# Patient Record
Sex: FEMALE | Race: ASIAN | NOT HISPANIC OR LATINO | Employment: FULL TIME | URBAN - METROPOLITAN AREA
[De-identification: names, ages, dates, MRNs, and addresses within clinical notes are randomized per-mention and may not be internally consistent; named-entity substitution may affect disease eponyms.]

---

## 2017-05-28 ENCOUNTER — OFFICE VISIT (OUTPATIENT)
Dept: URGENT CARE | Facility: PHYSICIAN GROUP | Age: 22
End: 2017-05-28

## 2017-05-28 VITALS
DIASTOLIC BLOOD PRESSURE: 80 MMHG | OXYGEN SATURATION: 97 % | TEMPERATURE: 99.1 F | WEIGHT: 181 LBS | RESPIRATION RATE: 18 BRPM | HEART RATE: 94 BPM | HEIGHT: 65 IN | SYSTOLIC BLOOD PRESSURE: 120 MMHG | BODY MASS INDEX: 30.16 KG/M2

## 2017-05-28 DIAGNOSIS — J03.90 EXUDATIVE TONSILLITIS: ICD-10-CM

## 2017-05-28 DIAGNOSIS — J02.9 SORE THROAT: ICD-10-CM

## 2017-05-28 DIAGNOSIS — J20.9 ACUTE BRONCHITIS, UNSPECIFIED ORGANISM: ICD-10-CM

## 2017-05-28 PROCEDURE — 99203 OFFICE O/P NEW LOW 30 MIN: CPT | Performed by: FAMILY MEDICINE

## 2017-05-28 RX ORDER — AMOXICILLIN AND CLAVULANATE POTASSIUM 875; 125 MG/1; MG/1
1 TABLET, FILM COATED ORAL 2 TIMES DAILY
Qty: 14 TAB | Refills: 0 | Status: SHIPPED | OUTPATIENT
Start: 2017-05-28 | End: 2017-06-04

## 2017-05-28 RX ORDER — PREDNISONE 20 MG/1
40 TABLET ORAL EVERY MORNING
Qty: 12 TAB | Refills: 0 | Status: SHIPPED | OUTPATIENT
Start: 2017-05-28 | End: 2017-06-03

## 2017-05-28 ASSESSMENT — ENCOUNTER SYMPTOMS
FEVER: 0
COUGH: 1
SPUTUM PRODUCTION: 1
SORE THROAT: 1
ORTHOPNEA: 0
CHILLS: 0
DIZZINESS: 0
FOCAL WEAKNESS: 0

## 2017-05-28 NOTE — PROGRESS NOTES
"Subjective:      Mateusz Cole is a 21 y.o. female who presents with Pharyngitis    Chief Complaint   Patient presents with   • Pharyngitis     sore throat, fever, bosy aches and [id number]        - This is a very pleasant 21 y.o. female with complaints of 1 week w/ ST and cough w/ subjective fevers. Not getting better w/ otc meds. No NV/cp/sob          ALLERGIES:  Review of patient's allergies indicates no known allergies.     PMH:  Past Medical History   Diagnosis Date   • Blind      Blind in right eye        MEDS:    Current outpatient prescriptions:   •  amoxicillin-clavulanate (AUGMENTIN) 875-125 MG Tab, Take 1 Tab by mouth 2 times a day for 7 days., Disp: 14 Tab, Rfl: 0  •  Hydrocod Polst-CPM Polst ER (TUSSIONEX) 10-8 MG/5ML Suspension Extended Release, Take 5 mL by mouth every 12 hours as needed for Cough., Disp: 120 mL, Rfl: 0  •  predniSONE (DELTASONE) 20 MG Tab, Take 2 Tabs by mouth every morning for 6 days., Disp: 12 Tab, Rfl: 0    ** I have documented what I find to be significant in regards to past medical, social, family and surgical history  in my HPI or under PMH/PSH/FH review section, otherwise it is contributory **             Pharyngitis   Associated symptoms include coughing. Pertinent negatives include no congestion.       Review of Systems   Constitutional: Negative for fever and chills.   HENT: Positive for sore throat. Negative for congestion.    Respiratory: Positive for cough and sputum production.    Cardiovascular: Negative for chest pain and orthopnea.   Neurological: Negative for dizziness and focal weakness.          Objective:     /80 mmHg  Pulse 94  Temp(Src) 37.3 °C (99.1 °F)  Resp 18  Ht 1.651 m (5' 5\")  Wt 82.101 kg (181 lb)  BMI 30.12 kg/m2  SpO2 97%  LMP 05/12/2017     Physical Exam   Constitutional: She appears well-developed. No distress.   HENT:   Head: Normocephalic and atraumatic.   Mouth/Throat: Oropharynx is clear and moist.   Eyes: Conjunctivae are " normal.   Neck: Neck supple.   Cardiovascular: Regular rhythm.    No murmur heard.  Pulmonary/Chest: Effort normal and breath sounds normal. No respiratory distress.   Neurological: She is alert. She exhibits normal muscle tone.   Skin: Skin is warm and dry.   Psychiatric: She has a normal mood and affect. Judgment normal.   Nursing note and vitals reviewed.              Assessment/Plan:         1. Sore throat     2. Exudative tonsillitis  amoxicillin-clavulanate (AUGMENTIN) 875-125 MG Tab   3. Acute bronchitis, unspecified organism  Hydrocod Polst-CPM Polst ER (TUSSIONEX) 10-8 MG/5ML Suspension Extended Release    predniSONE (DELTASONE) 20 MG Tab             Dx & d/c instructions discussed w/ patient and/or family members. Follow up w/ Prvt Dr or here in 3-4 days if not getting better, sooner if needed,  ER if worse and UC/PCP unavailable.        Possible side effects (i.e. Rash, GI upset/constipation, sedation, elevation of BP or sugars) of any medications given discussed.

## 2017-05-28 NOTE — MR AVS SNAPSHOT
"Mateusz Cole   2017 2:50 PM   Office Visit   MRN: [id number]    Department:  [facility]   Dept Phone:  [phone]    Description:  Female : 1995   Provider:  Champ Faustin M.D.           Reason for Visit     Pharyngitis sore throat, fever, bosy aches and [id number]      Allergies as of 2017     No Known Allergies      You were diagnosed with     Sore throat   [267003]       Exudative tonsillitis   [[id number]]       Acute bronchitis, unspecified organism   [[id number]]         Vital Signs     Blood Pressure Pulse Temperature Respirations Height Weight    120/80 mmHg 94 37.3 °C (99.1 °F) 18 1.651 m (5' 5\") 82.101 kg (181 lb)    Body Mass Index Oxygen Saturation Last Menstrual Period Smoking Status          30.12 kg/m2 97% 2017 Never Smoker         Basic Information     Date Of Birth Sex Race Ethnicity Preferred Language    1995 Female  Non- English      Health Maintenance     Patient has no pending health maintenance at this time      Current Immunizations     No immunizations on file.      Below and/or attached are the medications your provider expects you to take. Review all of your home medications and newly ordered medications with your provider and/or pharmacist. Follow medication instructions as directed by your provider and/or pharmacist. Please keep your medication list with you and share with your provider. Update the information when medications are discontinued, doses are changed, or new medications (including over-the-counter products) are added; and carry medication information at all times in the event of emergency situations     Allergies:  No Known Allergies          Medications  Valid as of: May 28, 2017 -  3:32 PM    Generic Name Brand Name Tablet Size Instructions for use    Amoxicillin-Pot Clavulanate (Tab) AUGMENTIN 875-125 MG Take 1 Tab by mouth 2 times a day for 7 days.        Hydrocod Polst-Chlorphen Polst (Suspension Extended " Release) TUSSIONEX 10-8 MG/5ML Take 5 mL by mouth every 12 hours as needed for Cough.        PredniSONE (Tab) DELTASONE 20 MG Take 2 Tabs by mouth every morning for 6 days.        .                 Medicines prescribed today were sent to:     [facility] [address] [place] [address] [place], [place] - [address] [facility] [place] [employer] [address]    [address] [place] [address]    Phone: [phone] Fax: [phone]    Open 24 Hours?: No      Medication refill instructions:       If your prescription bottle indicates you have medication refills left, it is not necessary to call your provider’s office. Please contact your pharmacy and they will refill your medication.    If your prescription bottle indicates you do not have any refills left, you may request refills at any time through one of the following ways: The online [employer] system (except Urgent Care), by calling your provider’s office, or by asking your pharmacy to contact your provider’s office with a refill request. Medication refills are processed only during regular business hours and may not be available until the next business day. Your provider may request additional information or to have a follow-up visit with you prior to refilling your medication.   *Please Note: Medication refills are assigned a new Rx number when refilled electronically. Your pharmacy may indicate that no refills were authorized even though a new prescription for the same medication is available at the pharmacy. Please request the medicine by name with the pharmacy before contacting your provider for a refill.           [employer] Access Code: [email or web address]  Expires: 6/27/2017  3:32 PM    [employer]  A secure, online tool to manage your health information     [employer]’s [employer]® is a secure, online tool that connects you to your personalized health information from the privacy of your home -- day or night - making it very easy for you to manage your healthcare. Once the activation process  is completed, you can even access your medical information using the [employer] benita, which is available for free in the Apple Benita store or Google Play store.     [employer] provides the following levels of access (as shown below):   My Chart Features   [facility] Doctor [facility]  Specialists [facility]  Urgent  Care [facility]  Primary Care  Doctor   Email your healthcare team securely and privately 24/7 X X X    Manage appointments: schedule your next appointment; view details of past/upcoming appointments X      Request prescription refills. X      View recent personal medical records, including lab and immunizations X X X X   View health record, including health history, allergies, medications X X X X   Read reports about your outpatient visits, procedures, consult and ER notes X X X X   See your discharge summary, which is a recap of your hospital and/or ER visit that includes your diagnosis, lab results, and care plan. X X       How to register for [employer]:  1. Go to  [email or web address].  2. Click on the Sign Up Now box, which takes you to the New Member Sign Up page. You will need to provide the following information:  a. Enter your [employer] Access Code exactly as it appears at the top of this page. (You will not need to use this code after you’ve completed the sign-up process. If you do not sign up before the expiration date, you must request a new code.)   b. Enter your date of birth.   c. Enter your home email address.   d. Click Submit, and follow the next screen’s instructions.  3. Create a [employer] ID. This will be your [employer] login ID and cannot be changed, so think of one that is secure and easy to remember.  4. Create a [employer] password. You can change your password at any time.  5. Enter your Password Reset Question and Answer. This can be used at a later time if you forget your password.   6. Enter your e-mail address. This allows you to receive e-mail notifications when new information is  available in [employer].  7. Click Sign Up. You can now view your health information.    For assistance activating your [employer] account, call [phone]

## 2018-11-13 ENCOUNTER — OFFICE VISIT (OUTPATIENT)
Dept: URGENT CARE | Facility: PHYSICIAN GROUP | Age: 23
End: 2018-11-13
Payer: COMMERCIAL

## 2018-11-13 ENCOUNTER — HOSPITAL ENCOUNTER (OUTPATIENT)
Facility: MEDICAL CENTER | Age: 23
End: 2018-11-13
Attending: NURSE PRACTITIONER
Payer: COMMERCIAL

## 2018-11-13 VITALS
OXYGEN SATURATION: 99 % | TEMPERATURE: 98 F | WEIGHT: 188 LBS | RESPIRATION RATE: 16 BRPM | DIASTOLIC BLOOD PRESSURE: 76 MMHG | HEIGHT: 65 IN | SYSTOLIC BLOOD PRESSURE: 122 MMHG | BODY MASS INDEX: 31.32 KG/M2 | HEART RATE: 76 BPM

## 2018-11-13 DIAGNOSIS — Z20.2 EXPOSURE TO CHLAMYDIA: ICD-10-CM

## 2018-11-13 PROCEDURE — 99214 OFFICE O/P EST MOD 30 MIN: CPT | Performed by: NURSE PRACTITIONER

## 2018-11-13 PROCEDURE — 87591 N.GONORRHOEAE DNA AMP PROB: CPT

## 2018-11-13 PROCEDURE — 99000 SPECIMEN HANDLING OFFICE-LAB: CPT | Performed by: NURSE PRACTITIONER

## 2018-11-13 PROCEDURE — 87491 CHLMYD TRACH DNA AMP PROBE: CPT

## 2018-11-13 RX ORDER — AZITHROMYCIN 500 MG/1
1000 TABLET, FILM COATED ORAL ONCE
Qty: 2 TAB | Refills: 0 | Status: SHIPPED | OUTPATIENT
Start: 2018-11-13 | End: 2018-11-13

## 2018-11-13 ASSESSMENT — ENCOUNTER SYMPTOMS
ABDOMINAL PAIN: 0
FEVER: 0

## 2018-11-14 DIAGNOSIS — Z20.2 EXPOSURE TO CHLAMYDIA: ICD-10-CM

## 2018-11-14 LAB
C TRACH DNA SPEC QL NAA+PROBE: POSITIVE
N GONORRHOEA DNA SPEC QL NAA+PROBE: NEGATIVE
SPECIMEN SOURCE: ABNORMAL

## 2018-11-14 NOTE — PROGRESS NOTES
"Subjective:      Mateusz Cole is a 23 y.o. female who presents with Exposure to STD            Exposure to STD   This is a new problem. Episode onset: pt reports her recet sexual partner notified her today that he tested positive for chlamydia. She does report some new, different vaginal discharge but no other symptoms. She started her period on schedule. The problem has been unchanged. Pertinent negatives include no abdominal pain or fever. She has tried nothing for the symptoms.       Review of Systems   Constitutional: Negative for fever.   Gastrointestinal: Negative for abdominal pain.   Genitourinary:        Exposure to chlamydia    All other systems reviewed and are negative.    Past Medical History:   Diagnosis Date   • Blind     Blind in right eye    No past surgical history on file.   Social History     Social History   • Marital status: Single     Spouse name: N/A   • Number of children: N/A   • Years of education: N/A     Occupational History   • Not on file.     Social History Main Topics   • Smoking status: Never Smoker   • Smokeless tobacco: Never Used   • Alcohol use No   • Drug use: No   • Sexual activity: Not on file     Other Topics Concern   • Not on file     Social History Narrative   • No narrative on file          Objective:     /76 (BP Location: Right arm, Patient Position: Sitting, BP Cuff Size: Adult)   Pulse 76   Temp 36.7 °C (98 °F) (Temporal)   Resp 16   Ht 1.651 m (5' 5\")   Wt 85.3 kg (188 lb)   LMP 11/11/2018   SpO2 99%   BMI 31.28 kg/m²      Physical Exam   Constitutional: She is oriented to person, place, and time. Vital signs are normal. She appears well-developed and well-nourished.   HENT:   Head: Normocephalic and atraumatic.   Eyes: Pupils are equal, round, and reactive to light. EOM are normal.   Neck: Normal range of motion.   Cardiovascular: Normal rate and regular rhythm.    Pulmonary/Chest: Effort normal.   Genitourinary:   Genitourinary Comments: Pt " deferred   Will perform self swab   Musculoskeletal: Normal range of motion.   Neurological: She is alert and oriented to person, place, and time.   Skin: Skin is warm and dry. Capillary refill takes less than 2 seconds.   Psychiatric: She has a normal mood and affect. Her speech is normal and behavior is normal. Thought content normal.   Vitals reviewed.              Assessment/Plan:     1. Exposure to chlamydia  - azithromycin (ZITHROMAX) 500 MG tablet; Take 2 Tabs by mouth Once for 1 dose.  Dispense: 2 Tab; Refill: 0  - CHLAMYDIA/GC PCR URINE OR SWAB; Future    Will call with results when available  Take ABX with food  Abstain from sexual intercourse for 2 weeks  Safe sex practices discussed  Supportive care, differential diagnoses, and indications for immediate follow-up discussed with patient.    Pathogenesis of diagnosis discussed including typical length and natural progression.      Instructed to return to  or nearest emergency department if symptoms fail to improve, for any change in condition, further concerns, or new concerning symptoms.  Patient states understanding of the plan of care and discharge instructions.

## 2019-09-28 ENCOUNTER — OFFICE VISIT (OUTPATIENT)
Dept: URGENT CARE | Facility: PHYSICIAN GROUP | Age: 24
End: 2019-09-28
Payer: COMMERCIAL

## 2019-09-28 ENCOUNTER — HOSPITAL ENCOUNTER (OUTPATIENT)
Facility: MEDICAL CENTER | Age: 24
End: 2019-09-28
Attending: PHYSICIAN ASSISTANT
Payer: COMMERCIAL

## 2019-09-28 VITALS
BODY MASS INDEX: 31.32 KG/M2 | RESPIRATION RATE: 16 BRPM | SYSTOLIC BLOOD PRESSURE: 118 MMHG | HEIGHT: 65 IN | TEMPERATURE: 98.2 F | WEIGHT: 188 LBS | OXYGEN SATURATION: 98 % | DIASTOLIC BLOOD PRESSURE: 80 MMHG | HEART RATE: 78 BPM

## 2019-09-28 DIAGNOSIS — N39.0 URINARY TRACT INFECTION WITH HEMATURIA, SITE UNSPECIFIED: ICD-10-CM

## 2019-09-28 DIAGNOSIS — R30.0 DYSURIA: ICD-10-CM

## 2019-09-28 DIAGNOSIS — R31.9 URINARY TRACT INFECTION WITH HEMATURIA, SITE UNSPECIFIED: ICD-10-CM

## 2019-09-28 LAB
APPEARANCE UR: CLEAR
BILIRUB UR STRIP-MCNC: NORMAL MG/DL
COLOR UR AUTO: NORMAL
GLUCOSE UR STRIP.AUTO-MCNC: NORMAL MG/DL
INT CON NEG: NORMAL
INT CON POS: NORMAL
KETONES UR STRIP.AUTO-MCNC: 15 MG/DL
LEUKOCYTE ESTERASE UR QL STRIP.AUTO: NORMAL
NITRITE UR QL STRIP.AUTO: NORMAL
PH UR STRIP.AUTO: 5.5 [PH] (ref 5–8)
POC URINE PREGNANCY TEST: NEGATIVE
PROT UR QL STRIP: NORMAL MG/DL
RBC UR QL AUTO: NORMAL
SP GR UR STRIP.AUTO: 1.02
UROBILINOGEN UR STRIP-MCNC: 0.2 MG/DL

## 2019-09-28 PROCEDURE — 87086 URINE CULTURE/COLONY COUNT: CPT

## 2019-09-28 PROCEDURE — 81025 URINE PREGNANCY TEST: CPT | Performed by: PHYSICIAN ASSISTANT

## 2019-09-28 PROCEDURE — 81002 URINALYSIS NONAUTO W/O SCOPE: CPT | Performed by: PHYSICIAN ASSISTANT

## 2019-09-28 PROCEDURE — 99214 OFFICE O/P EST MOD 30 MIN: CPT | Performed by: PHYSICIAN ASSISTANT

## 2019-09-28 RX ORDER — NITROFURANTOIN 25; 75 MG/1; MG/1
100 CAPSULE ORAL EVERY 12 HOURS
Qty: 10 CAP | Refills: 0 | Status: SHIPPED | OUTPATIENT
Start: 2019-09-28 | End: 2019-10-03

## 2019-09-28 ASSESSMENT — ENCOUNTER SYMPTOMS
EYE DISCHARGE: 0
NAUSEA: 1
FEVER: 0
EYE REDNESS: 0
SHORTNESS OF BREATH: 0
ABDOMINAL PAIN: 1
SORE THROAT: 0
VOMITING: 0
COUGH: 0
FLANK PAIN: 1
HEADACHES: 0

## 2019-09-28 NOTE — LETTER
[facility] [place]  [address]  [place] [address]     September 28, 2019    Patient: Mateusz Cole   YOB: 1995   Date of Visit: 9/28/2019       To Whom It May Concern:    Mateusz Cole was seen and treated in our department on 9/28/2019.     Sincerely,     Lynn De Los Santos P.A.-C.

## 2019-09-28 NOTE — PROGRESS NOTES
"Subjective:      Mateusz Cole is a 24 y.o. female who presents with Dysuria (w/ side and flank pain x 2 days)        Dysuria    This is a new problem. Episode onset: x 10 days ago. The problem occurs every urination. The problem has been unchanged. The quality of the pain is described as burning. The pain is mild. There has been no fever. There is no history of pyelonephritis. Associated symptoms include flank pain (right side), frequency, nausea and urgency. Pertinent negatives include no discharge, hematuria or vomiting. Treatments tried: Azo. The treatment provided mild relief. There is no history of kidney stones.       PMH:  has a past medical history of Blind.  MEDS:   Current Outpatient Medications:   •  Hydrocod Polst-CPM Polst ER (TUSSIONEX) 10-8 MG/5ML Suspension Extended Release, Take 5 mL by mouth every 12 hours as needed for Cough., Disp: 120 mL, Rfl: 0  ALLERGIES: No Known Allergies  SURGHX: No past surgical history on file.  SOCHX:  reports that she has never smoked. She has never used smokeless tobacco. She reports that she does not drink alcohol or use drugs.  FH: Family history was reviewed, no pertinent findings to report      Review of Systems   Constitutional: Negative for fever.   HENT: Negative for congestion, ear pain and sore throat.    Eyes: Negative for discharge and redness.   Respiratory: Negative for cough and shortness of breath.    Cardiovascular: Negative for chest pain and leg swelling.   Gastrointestinal: Positive for abdominal pain (lower abdominal pain) and nausea. Negative for vomiting.   Genitourinary: Positive for dysuria, flank pain (right side), frequency and urgency. Negative for hematuria.   Skin: Negative for rash.   Neurological: Negative for headaches.          Objective:     /80   Pulse 78   Temp 36.8 °C (98.2 °F) (Temporal)   Resp 16   Ht 1.651 m (5' 5\")   Wt 85.3 kg (188 lb)   LMP 09/19/2019 (Exact Date)   SpO2 98%   BMI 31.28 kg/m²      Physical " Exam   Constitutional: She is oriented to person, place, and time. She appears well-developed and well-nourished. No distress.   HENT:   Head: Normocephalic and atraumatic.   Right Ear: External ear normal.   Left Ear: External ear normal.   Nose: Nose normal.   Mouth/Throat: Oropharynx is clear and moist.   Eyes: Conjunctivae and EOM are normal.   Neck: Normal range of motion. Neck supple.   Cardiovascular: Normal rate, regular rhythm and normal heart sounds.   Pulmonary/Chest: Effort normal and breath sounds normal.   Abdominal: Soft. There is tenderness in the suprapubic area. There is no CVA tenderness.   Musculoskeletal: Normal range of motion.   Neurological: She is alert and oriented to person, place, and time.   Skin: Skin is warm and dry.          Progress:  POCT Urinalysis:  GLU: Negative  YURIY: Negative  KET: 15  S.025  BLO: Small  PH: 5.5  PRO: Negative  URO: 0.2  NIT: Negative  EDDIE: Trace    POCT Pregnancy: Negative     Urine Culture - pending      Assessment/Plan:     1. Dysuria  - POCT Urinalysis  - POCT PREGNANCY  - Urine Culture; Future    2. Urinary tract infection with hematuria, site unspecified  - nitrofurantoin monohyd macro (MACROBID) 100 MG Cap; Take 1 Cap by mouth every 12 hours for 5 days.  Dispense: 10 Cap; Refill: 0    Differential diagnoses, supportive care, and indications for immediate follow-up discussed with patient.   Instructed to return to clinic or nearest emergency department for any change in condition, further concerns, or worsening of symptoms.    OTC Tylenol or Motrin for fever/discomfort.  Drink plenty of fluids  Follow-up with PCP  Return to clinic or go to the ED if symptoms worsen or fail to improve, or if the patient should develop worsening/increasing urinary symptoms, hematuria, flank pain, abdominal pain, nausea/vomiting, fever/chills, and/or any concerning symptoms.    Discussed plan with the patient, and she agrees to the above.

## 2019-09-29 DIAGNOSIS — R30.0 DYSURIA: ICD-10-CM

## 2019-10-01 LAB
BACTERIA UR CULT: NORMAL
SIGNIFICANT IND 70042: NORMAL
SITE SITE: NORMAL
SOURCE SOURCE: NORMAL

## 2019-10-07 ENCOUNTER — PATIENT MESSAGE (OUTPATIENT)
Dept: URGENT CARE | Facility: PHYSICIAN GROUP | Age: 24
End: 2019-10-07

## 2021-09-23 ENCOUNTER — OFFICE VISIT (OUTPATIENT)
Dept: URGENT CARE | Facility: CLINIC | Age: 26
End: 2021-09-23
Payer: COMMERCIAL

## 2021-09-23 VITALS
HEIGHT: 65 IN | BODY MASS INDEX: 32.55 KG/M2 | TEMPERATURE: 97.2 F | HEART RATE: 89 BPM | OXYGEN SATURATION: 97 % | DIASTOLIC BLOOD PRESSURE: 78 MMHG | WEIGHT: 195.4 LBS | SYSTOLIC BLOOD PRESSURE: 122 MMHG

## 2021-09-23 DIAGNOSIS — R22.0 SWOLLEN LIP: ICD-10-CM

## 2021-09-23 DIAGNOSIS — K12.0 APHTHOUS ULCER OF MOUTH: ICD-10-CM

## 2021-09-23 PROCEDURE — 99214 OFFICE O/P EST MOD 30 MIN: CPT | Performed by: NURSE PRACTITIONER

## 2021-09-23 RX ORDER — METHYLPREDNISOLONE SODIUM SUCCINATE 125 MG/2ML
125 INJECTION, POWDER, LYOPHILIZED, FOR SOLUTION INTRAMUSCULAR; INTRAVENOUS ONCE
Status: COMPLETED | OUTPATIENT
Start: 2021-09-23 | End: 2021-09-23

## 2021-09-23 RX ORDER — VALACYCLOVIR HYDROCHLORIDE 1 G/1
1000 TABLET, FILM COATED ORAL 2 TIMES DAILY
Qty: 14 TABLET | Refills: 0 | Status: SHIPPED | OUTPATIENT
Start: 2021-09-23 | End: 2021-09-30

## 2021-09-23 RX ORDER — PREDNISONE 20 MG/1
40 TABLET ORAL DAILY
Qty: 6 TABLET | Refills: 0 | Status: SHIPPED | OUTPATIENT
Start: 2021-09-23 | End: 2021-09-26

## 2021-09-23 RX ADMIN — METHYLPREDNISOLONE SODIUM SUCCINATE 125 MG: 125 INJECTION, POWDER, LYOPHILIZED, FOR SOLUTION INTRAMUSCULAR; INTRAVENOUS at 09:37

## 2021-09-23 NOTE — LETTER
September 23, 2021         Patient: Mateusz Cole   YOB: 1995   Date of Visit: 9/23/2021           To Whom it May Concern:    Mateusz Cole was seen in my clinic on 9/23/2021. She may return to work on 09/25/2021.    If you have any questions or concerns, please don't hesitate to call.        Sincerely,           HOMER Yap.  Electronically Signed

## 2021-09-23 NOTE — PROGRESS NOTES
Subjective:     Mateusz Cole is a 26 y.o. female who presents for Oral Swelling      Presents for lower lip swelling. States she woke up at 4 am with symptoms, and took a benadryl. Tongue and throat are ok. Are is painful, tender. Numbness to lower lip. Hx of cystic acne. Also c/o headache. Denies recent cosmetic procedures. Hx of cold sore. No new medications. No URI symptoms.              Past Medical History:   Diagnosis Date   • Blind     Blind in right eye       No past surgical history on file.    Social History     Socioeconomic History   • Marital status: Single     Spouse name: Not on file   • Number of children: Not on file   • Years of education: Not on file   • Highest education level: Not on file   Occupational History   • Not on file   Tobacco Use   • Smoking status: Never Smoker   • Smokeless tobacco: Never Used   Substance and Sexual Activity   • Alcohol use: No   • Drug use: No   • Sexual activity: Not on file   Other Topics Concern   • Not on file   Social History Narrative   • Not on file     Social Determinants of Health     Financial Resource Strain:    • Difficulty of Paying Living Expenses:    Food Insecurity:    • Worried About Running Out of Food in the Last Year:    • Ran Out of Food in the Last Year:    Transportation Needs:    • Lack of Transportation (Medical):    • Lack of Transportation (Non-Medical):    Physical Activity:    • Days of Exercise per Week:    • Minutes of Exercise per Session:    Stress:    • Feeling of Stress :    Social Connections:    • Frequency of Communication with Friends and Family:    • Frequency of Social Gatherings with Friends and Family:    • Attends [employer] Services:    • Active Member of Clubs or Organizations:    • Attends Club or Organization Meetings:    • Marital Status:    Intimate Partner Violence:    • Fear of Current or Ex-Partner:    • Emotionally Abused:    • Physically Abused:    • Sexually Abused:         No family history on file.  "    No Known Allergies    Review of Systems   Constitutional: Negative for fever.   HENT: Negative for sore throat.    Respiratory: Negative for cough and shortness of breath.    Skin: Negative for rash.   Neurological: Positive for headaches.   All other systems reviewed and are negative.       Objective:   /78 (BP Location: Right arm, Patient Position: Sitting, BP Cuff Size: Adult)   Pulse 89   Temp 36.2 °C (97.2 °F) (Temporal)   Ht 1.646 m (5' 4.8\")   Wt 88.6 kg (195 lb 6.4 oz)   SpO2 97%   BMI 32.72 kg/m²     Physical Exam  Vitals reviewed.   Constitutional:       General: She is not in acute distress.     Appearance: She is well-developed. She is not toxic-appearing.   HENT:      Head: Normocephalic and atraumatic.      Right Ear: External ear normal.      Left Ear: External ear normal.      Nose: Nose normal.      Mouth/Throat:      Lips: Pink.      Mouth: Mucous membranes are moist. Oral lesions present.      Pharynx: Oropharynx is clear. Uvula midline.      Comments: Edema to lower left lip. No Lip lesions or erythema. Aphthous ulceration left sublingual area. Patient states she's had a sore under her tongue x 2 days. No palpated swollen glands. No oropharyngeal or tongue edema. Clear speech.  Eyes:      Conjunctiva/sclera: Conjunctivae normal.   Cardiovascular:      Rate and Rhythm: Normal rate.   Pulmonary:      Effort: Pulmonary effort is normal. No respiratory distress.      Breath sounds: Normal breath sounds.   Musculoskeletal:         General: Normal range of motion.      Cervical back: Normal range of motion.   Lymphadenopathy:      Head:      Right side of head: No submental, submandibular, tonsillar, preauricular, posterior auricular or occipital adenopathy.      Left side of head: No submental, submandibular, tonsillar, preauricular, posterior auricular or occipital adenopathy.   Skin:     General: Skin is warm and dry.      Findings: No rash.   Neurological:      General: No focal " deficit present.      Mental Status: She is alert and oriented to person, place, and time.      GCS: GCS eye subscore is 4. GCS verbal subscore is 5. GCS motor subscore is 6.   Psychiatric:         Mood and Affect: Mood normal.         Speech: Speech normal.         Behavior: Behavior normal.         Thought Content: Thought content normal.         Judgment: Judgment normal.         Assessment/Plan:   1. Swollen lip  - methylPREDNISolone sod succ (SOLU-MEDROL) 125 MG injection 125 mg  - AMB REFERRAL TO ESTABLISH WITH [facility] PCP  - valacyclovir (VALTREX) 1 GM Tab; Take 1 Tablet by mouth 2 times a day for 7 days.  Dispense: 14 Tablet; Refill: 0  - predniSONE (DELTASONE) 20 MG Tab; Take 2 Tablets by mouth every day for 3 days.  Dispense: 6 Tablet; Refill: 0    2. Aphthous ulcer of mouth  - valacyclovir (VALTREX) 1 GM Tab; Take 1 Tablet by mouth 2 times a day for 7 days.  Dispense: 14 Tablet; Refill: 0  -OTC pain medication    Follow up in the ER for increased swelling, tongue or throat swelling, SOB, difficulty swallowing.    No oropharyngeal edema or indicationof compromised airway. Discussed herpetic of viral cause for aphthous ulceration, likely contributing to lip swelling. Has hx of HSV.     Differential diagnosis, natural history, supportive care, and indications for immediate follow-up discussed.

## 2021-09-23 NOTE — PATIENT INSTRUCTIONS
Oral Ulcers  Oral ulcers are small sores inside the mouth or near the mouth. They may occur on or inside the lips, inside the cheeks, on the tongue, or anywhere else inside or near the mouth. They may be called canker sores or cold sores, which are two types of oral ulcers. Many oral ulcers are harmless and go away on their own. In some cases, oral ulcers may require medical care to determine the cause and proper treatment.  What are the causes?  Common causes of this condition include:  · Infections caused by viruses, bacteria, or fungi.  · Emotional stress.  · Foods or chemicals that irritate the mouth.  · Injury or physical irritation of the mouth.  · Medicines.  · Allergies.  · Tobacco use.  Less common causes include:  · Skin disease.  · A type of herpes virus infection (herpes simplex or herpes zoster).  · Oral cancer.  In some cases, the cause may not be known.  What increases the risk?  You are more likely to develop this condition if:  · You wear dental braces, dentures, or retainers.  · You have poor oral hygiene.  · You have sensitive skin.  · You have a condition that affects the entire body (systemic condition), such as an immune disorder.  What are the signs or symptoms?  The main symptom of this condition is having one or more oval-shaped or round ulcers that have red borders. Symptoms may vary depending on the cause. This includes:  · Location of the ulcers. Ulcers may be found inside the mouth, on the gums, or on the insides of the lips or cheeks. They may also be found on the lips or on skin that is near the mouth, such as the cheeks or chin.  · Pain. Ulcers can be painful and uncomfortable, or they can be painless.  · Appearance of the ulcers. They may look like red blisters and be filled with fluid, or they may be white or yellow patches.  · Frequency of outbreaks. Ulcers may go away permanently after one outbreak, or they may come back (recur) often or rarely.  How is this diagnosed?  This  condition is diagnosed with a physical exam. Your health care provider may ask you questions about your lifestyle and your medical history. You may have tests, including:  · Blood tests.  · Removal of a small number of cells from an ulcer to be examined under a microscope (biopsy).  How is this treated?  Treatment depends on the severity and cause of the condition. Oral ulcers often go away on their own in 1-2 weeks. Treatment may include medicines, such as:  · Medicines to treat a viral infection (antivirals), a bacterial infection (antibiotics), or a fungal infection (antifungals).  · Medicines to help control pain. This may include:  ? Over-the-counter pain medicines.  ? Gel, cream, or spray to numb the area (topical anesthetic) if you have severe pain.  ? Other medicines to coat or numb your mouth.  Follow these instructions at home:  Medicines  · Take or use over-the-counter and prescription medicines only as told by your health care provider.  · If you were prescribed an antibiotic medicine, take it as told by your health care provider. Do not stop taking the antibiotic even if you start to feel better.  · Do not use products that contain benzocaine (including numbing gels) to treat teething or mouth pain in children who are younger than 2 years. These products may cause a rare but serious blood condition.  Eating and drinking  · Eat a balanced diet. Do not eat:  ? Spicy foods.  ? Citrus, such as oranges.  ? Other foods and drinks that you think may cause or irritate your ulcers.  · Drink enough fluid to keep your urine pale yellow.  · Avoid drinking alcohol.  Lifestyle    · Practice good oral hygiene:  ? Gently brush your teeth with a soft toothbrush two times a day.  ? Floss your teeth every day.  ? Get regular dental cleanings and checkups.  · Do not use any products that contain nicotine or tobacco, such as cigarettes and e-cigarettes. If you need help quitting, ask your health care provider.  Managing  pain  · If directed, put ice on your face in the affected area to help reduce pain.  ? Put ice in a plastic bag.  ? Place a towel between your skin and the bag.  ? Leave the ice on for 20 minutes, 2-3 times a day.  · Avoid physical or chemical irritants that may have caused the ulcers or made them worse, such as mouthwashes that contain alcohol (ethanol). If you wear dental braces, dentures, or retainers, work with your health care provider to make sure these devices are fitted correctly.  · If you were prescribed a prescription mouthwash to help reduce pain in your mouth, use it as told by your health care provider.  General instructions  · Rinse with a salt-water mixture 3-4 times a day or as told by your health care provider. To make a salt-water mixture, completely dissolve ½-1 tsp (3-6 g) of salt in 1 cup (237 mL) of warm water.  · Keep all follow-up visits as told by your health care provider. This is important.  Contact a health care provider if:  · You have:  ? Pain that gets worse or does not get better with medicine.  ? Four or more ulcers at one time.  ? A fever.  ? New ulcers that look or feel different from other ulcers you have.  ? Inflammation in one eye or both eyes.  ? Ulcers that do not go away after 10 days.  · You develop new symptoms in your mouth, such as:  ? Bleeding or crusting around your lips or gums.  ? Tooth pain.  ? Difficulty swallowing.  · You develop symptoms on your skin or genitals, such as:  ? A rash or blisters.  ? Burning or itching sensations.  · Your ulcers begin or get worse after you start a new medicine.  Get help right away if you have:  · Difficulty breathing.  · Swelling in your face or neck.  · Excessive bleeding from your mouth.  · Severe pain.  Summary  · Oral ulcers may occur anywhere inside or near the mouth.  · They can be caused by many things, such as infections, stress, injury or irritation, or tobacco use.  · Oral ulcers can be painful or painless.  · Treatment  may include medicines to relieve pain or to treat an infection (if appropriate).  · Most oral ulcers go away in 1-2 weeks.  This information is not intended to replace advice given to you by your health care provider. Make sure you discuss any questions you have with your health care provider.  Document Released: 01/25/2006 Document Revised: 05/02/2019 Document Reviewed: 05/02/2019  [employer] Patient Education © 2020 [employer]      Cold Sore    A cold sore, also called a fever blister, is a small, fluid-filled sore that forms inside the mouth or on the lips, gums, nose, chin, or cheeks. Cold sores can spread to other parts of the body, such as the eyes or fingers. In some people who have other medical conditions, cold sores can spread to multiple other body sites, including the genitals.  Cold sores can spread from person to person (are contagious) until the sores crust over completely. Most cold sores go away within 2 weeks.  What are the causes?  Cold sores are caused by an infection from a common type of herpes simplex virus (HSV-1). HSV-1 is closely related to the HSV-2virus, which is the virus that causes genital herpes, but these viruses are not the same. Once a person is infected with HSV-1, the virus remains permanently in the body.  HSV-1 is spread from person to person through close contact, such as through kissing, touching the affected area, or sharing personal items such as lip balm, razors, a drinking glass, or eating utensils.  What increases the risk?  You are more likely to develop this condition if you:  · Are tired, stressed, or sick.  · Are menstruating.  · Are pregnant.  · Take certain medicines.  · Are exposed to cold weather or too much sun.  What are the signs or symptoms?  Symptoms of a cold sore outbreak go through different stages. These are the stages of a cold sore:  · Tingling, itching, or burning is felt 1-2 days before the outbreak.  · Fluid-filled blisters appear on the lips,  inside the mouth, on the nose, or on the cheeks.  · The blisters start to ooze clear fluid.  · The blisters dry up, and a yellow crust appears in their place.  · The crust falls off.  In some cases, other symptoms can develop during a cold sore outbreak. These can include:  · Fever.  · Sore throat.  · Headache.  · Muscle aches.  · Swollen neck glands.  How is this diagnosed?  This condition is diagnosed based on your medical history and a physical exam. Your health care provider may do a blood test or may swab some fluid from your sore and then examine the swab in the lab.  How is this treated?  There is no cure for cold sores or HSV-1. There is also no vaccine for HSV-1. Most cold sores go away on their own without treatment within 2 weeks. Medicines cannot make the infection go away, but your health care provider may prescribe medicines to:  · Help relieve some of the pain associated with the sores.  · Work to stop the virus from multiplying.  · Shorten healing time.  Medicines may be in the form of creams, gels, pills, or a shot.  Follow these instructions at home:  Medicines  · Take or apply over-the-counter and prescription medicines only as told by your health care provider.  · Use a cotton-tip swab to apply creams or gels to your sores.  · Ask your health care provider if you can take lysine supplements. Research has found that lysine may help heal the cold sore faster and prevent outbreaks.  Sore care    · Do not touch the sores or pick the scabs.  · Wash your hands often. Do not touch your eyes without washing your hands first.  · Keep the sores clean and dry.  · If directed, apply ice to the sores:  ? Put ice in a plastic bag.  ? Place a towel between your skin and the bag.  ? Leave the ice on for 20 minutes, 2-3 times a day.  Eating and drinking  · Eat a soft, bland diet. Avoid eating hot, cold, or salty foods.  · Use a straw if it hurts to drink out of a glass.  · Eat foods that are rich in lysine, such  as meat, fish, and dairy products.  · Avoid sugary foods, chocolates, nuts, and grains. These foods are rich in a nutrient called arginine, which can cause the virus to multiply.  Lifestyle  · Do not kiss, have oral sex, or share personal items until your sores heal.  · Stress, poor sleep, and being out in the sun can trigger outbreaks. Make sure you:  ? Do activities that help you relax, such as deep breathing exercises or meditation.  ? Get enough sleep.  ? Apply sunscreen on your lips before you go out in the sun.  Contact a health care provider if:  · You have symptoms for more than 2 weeks.  · You have pus coming from the sores.  · You have redness that is spreading.  · You have pain or irritation in your eye.  · You get sores on your genitals.  · Your sores do not heal within 2 weeks.  · You have frequent cold sore outbreaks.  Get help right away if you have:  · A fever and your symptoms suddenly get worse.  · A headache and confusion.  · Fatigue or loss of appetite.  · A stiff neck or sensitivity to light.  Summary  · A cold sore, also called a fever blister, is a small, fluid-filled sore that forms inside the mouth or on the lips, gums, nose, chin, or cheeks.  · Most cold sores go away on their own without treatment within 2 weeks. Your health care provider may prescribe medicines to help relieve some of the pain, work to stop the virus from multiplying, and shorten healing time.  · Wash your hands often. Do not touch your eyes without washing your hands first.  · Do not kiss, have oral sex, or share personal items until your sores heal.  · Contact a health care provider if your sores do not heal within 2 weeks.  This information is not intended to replace advice given to you by your health care provider. Make sure you discuss any questions you have with your health care provider.  Document Released: 12/15/2001 Document Revised: 04/08/2020 Document Reviewed: 05/20/2019  [employer] Patient Education © 2020  [employer]      Angioedema  Angioedema is the sudden swelling of tissue in the body. Angioedema can affect any part of the body, but it most often affects the deeper parts of the skin, causing red, itchy patches (hives) to appear over the affected area. It often begins during the night and is found in the morning.  Depending on the cause, angioedema may happen:  · Only once.  · Several times. It may come back in unpredictable patterns.  · Repeatedly for several years. Over time, it may gradually stop coming back.  Angioedema can be life-threatening if it affects the air passages that you breathe through.  What are the causes?  This condition may be caused by:  · Foods, such as milk, eggs, shellfish, wheat, or nuts.  · Certain medicines, such as ACE inhibitors, antibiotics, nonsteroidal anti-inflammatory drugs, birth control pills, or dyes used in X-rays.  · Insect stings.  · Infections.  Angioedema can be inherited, and episodes can be triggered by:  · Mild injury.  · Dental work.  · Surgery.  · Stress.  · Sudden changes in temperature.  · Exercise.  In some cases, the cause of this condition is not known.  What are the signs or symptoms?  Symptoms of this condition depend on where the swelling happens. Symptoms may include:  · Swollen skin.  · Red, itchy patches of skin (hives).  · Redness in the affected area.  · Pain in the affected area.  · Swollen lips or tongue.  · Wheezing.  · Breathing problems.    If your internal organs are involved, symptoms may also include:  · Nausea.  · Abdominal pain.  · Vomiting.  · Difficulty swallowing.  · Difficulty passing urine.  How is this diagnosed?  This condition may be diagnosed based on:  · An exam of the affected area.  · Your medical history.  · Whether anyone in your family has had this condition before.  · A review of any medicines you have been taking.  · Tests, including:  ? Allergy skin tests to see if the condition was caused by an allergic reaction.  ? Blood  tests to see if the condition was caused by a gene.  ? Tests to check for underlying diseases that could cause the condition.  How is this treated?  Treatment for this condition depends on the cause. It may involve any of the following:  · If something triggered the condition, making changes to keep it from triggering the condition again.  · If the condition affects your breathing, having tubes placed in your airway to keep it open.  · Taking medicines to treat symptoms or prevent future episodes. These may include:  ? Antihistamines.  ? Epinephrine injections.  ? Steroids.  If your condition is severe, you may need to be treated at the hospital. Angioedema usually gets better in 24-48 hours.  Follow these instructions at home:  · Take over-the-counter and prescription medicines only as told by your health care provider.  · If you were given medicines for emergency allergy treatment, always carry them with you.  · Wear a medical bracelet as told by your health care provider.  · If something triggers your condition, avoid the trigger, if possible.  · If your condition is inherited and you are thinking about having children, talk to your health care provider. It is important to discuss the risks of passing on the condition to your children.  Contact a health care provider if:  · You have repeated episodes of angioedema.  · Episodes of angioedema start to happen more often than they used to, even after you take steps to prevent them.  · You have episodes of angioedema that are more severe than they have been before, even after you take steps to prevent them.  · You are thinking about having children.  Get help right away if:  · You have severe swelling of your mouth, tongue, or lips.  · You have trouble breathing.  · You have trouble swallowing.  · You faint.  This information is not intended to replace advice given to you by your health care provider. Make sure you discuss any questions you have with your health care  provider.  Document Released: 02/26/2003 Document Revised: 11/30/2018 Document Reviewed: 06/27/2017  Elsevier Patient Education © 2020 [employer]

## 2021-09-27 ENCOUNTER — TELEPHONE (OUTPATIENT)
Dept: SCHEDULING | Facility: IMAGING CENTER | Age: 26
End: 2021-09-27

## 2021-10-01 ENCOUNTER — OFFICE VISIT (OUTPATIENT)
Dept: MEDICAL GROUP | Facility: PHYSICIAN GROUP | Age: 26
End: 2021-10-01
Attending: NURSE PRACTITIONER
Payer: COMMERCIAL

## 2021-10-01 VITALS
HEIGHT: 65 IN | BODY MASS INDEX: 32.69 KG/M2 | OXYGEN SATURATION: 99 % | TEMPERATURE: 98.3 F | SYSTOLIC BLOOD PRESSURE: 122 MMHG | WEIGHT: 196.2 LBS | DIASTOLIC BLOOD PRESSURE: 82 MMHG | HEART RATE: 79 BPM

## 2021-10-01 DIAGNOSIS — F43.23 ADJUSTMENT DISORDER WITH MIXED ANXIETY AND DEPRESSED MOOD: ICD-10-CM

## 2021-10-01 DIAGNOSIS — Z23 NEED FOR VACCINATION: ICD-10-CM

## 2021-10-01 DIAGNOSIS — R51.9 ACUTE NONINTRACTABLE HEADACHE, UNSPECIFIED HEADACHE TYPE: ICD-10-CM

## 2021-10-01 DIAGNOSIS — H54.40: ICD-10-CM

## 2021-10-01 PROCEDURE — 90715 TDAP VACCINE 7 YRS/> IM: CPT | Performed by: STUDENT IN AN ORGANIZED HEALTH CARE EDUCATION/TRAINING PROGRAM

## 2021-10-01 PROCEDURE — 99214 OFFICE O/P EST MOD 30 MIN: CPT | Mod: 25 | Performed by: STUDENT IN AN ORGANIZED HEALTH CARE EDUCATION/TRAINING PROGRAM

## 2021-10-01 PROCEDURE — 90686 IIV4 VACC NO PRSV 0.5 ML IM: CPT | Performed by: STUDENT IN AN ORGANIZED HEALTH CARE EDUCATION/TRAINING PROGRAM

## 2021-10-01 PROCEDURE — 90471 IMMUNIZATION ADMIN: CPT | Performed by: STUDENT IN AN ORGANIZED HEALTH CARE EDUCATION/TRAINING PROGRAM

## 2021-10-01 PROCEDURE — 90472 IMMUNIZATION ADMIN EACH ADD: CPT | Performed by: STUDENT IN AN ORGANIZED HEALTH CARE EDUCATION/TRAINING PROGRAM

## 2021-10-01 RX ORDER — IBUPROFEN 200 MG
400 TABLET ORAL EVERY 8 HOURS PRN
COMMUNITY

## 2021-10-01 ASSESSMENT — PATIENT HEALTH QUESTIONNAIRE - PHQ9
CLINICAL INTERPRETATION OF PHQ2 SCORE: 2
SUM OF ALL RESPONSES TO PHQ QUESTIONS 1-9: 13
5. POOR APPETITE OR OVEREATING: 3 - NEARLY EVERY DAY

## 2021-10-01 ASSESSMENT — ANXIETY QUESTIONNAIRES
6. BECOMING EASILY ANNOYED OR IRRITABLE: MORE THAN HALF THE DAYS
2. NOT BEING ABLE TO STOP OR CONTROL WORRYING: SEVERAL DAYS
5. BEING SO RESTLESS THAT IT IS HARD TO SIT STILL: SEVERAL DAYS
IF YOU CHECKED OFF ANY PROBLEMS ON THIS QUESTIONNAIRE, HOW DIFFICULT HAVE THESE PROBLEMS MADE IT FOR YOU TO DO YOUR WORK, TAKE CARE OF THINGS AT HOME, OR GET ALONG WITH OTHER PEOPLE: SOMEWHAT DIFFICULT
1. FEELING NERVOUS, ANXIOUS, OR ON EDGE: MORE THAN HALF THE DAYS
3. WORRYING TOO MUCH ABOUT DIFFERENT THINGS: MORE THAN HALF THE DAYS
4. TROUBLE RELAXING: MORE THAN HALF THE DAYS
7. FEELING AFRAID AS IF SOMETHING AWFUL MIGHT HAPPEN: NOT AT ALL
GAD7 TOTAL SCORE: 10

## 2021-10-01 NOTE — LETTER
[facility]  [address]  [place] [id number]     October 1, 2021    Patient: Mateusz Cole   YOB: 1995   Date of Visit: 10/1/2021       To Whom It May Concern:    Mateusz Cole was seen in our department on 10/1/2021.     Sincerely,         Keiry Rubio D.O.

## 2021-10-01 NOTE — ASSESSMENT & PLAN NOTE
This is a chronic condition, patient reports she does not eat the best diet and does not exercise.  She stays active at work however.

## 2021-10-01 NOTE — PROGRESS NOTES
Subjective:     CC: Establish care, continued headache after urgent care visit    HISTORY OF THE PRESENT ILLNESS: Patient is a 26 y.o. female. This pleasant patient is here today to establish care and discuss headache.  No prior PCP.    Patient was seen in urgent care on September 23 for swollen lips and ulcer of mouth.  She was provided with Solu-Medrol followed by 3 days of prednisone and Valtrex for 7 days. Headache started with medications, last dose of vatrex Wednesday. Had SE of nausea, lost of appetite and body aches while on the medication-those symptoms have resolved. Called out of work on Monday due to headache which is bitemporal and improving, response to ibuprofen.  Reports no sore throat, lip swelling or ulcer.    Adjustment disorder with mixed anxiety and depressed mood  Patient reports this to be a chronic condition.  Depends on the day or situation.  Reports that finances are her current stressor.  Reports she had counseling prior which was not really helpful, no prior medications.  Denies any frequent panic attacks (although reports had an episode about a month ago, self resolved) or SI/HI.    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  States that she had a surgery for strabismus at a young age but otherwise does not follow with optometry.  Can see some light/shadows but overall blind.    BMI 32.0-32.9,adult  This is a chronic condition, patient reports she does not eat the best diet and does not exercise.  She stays active at work however.    Medication: Denies    Health Maintenance: Recommended Tdap and flu, due for Pap.    ROS:   Gen: no fevers/chills, no changes in weight  ENT: no sore throat  Pulm: no sob, no cough  CV: no chest pain, no palpitations  GI: no nausea/vomiting, no diarrhea/constipation or abdominal pain  Skin: no rash  Neuro: no numbness/tingling      Objective:     Exam: /82 (BP Location: Left arm,  "Patient Position: Sitting, BP Cuff Size: Adult)   Pulse 79   Temp 36.8 °C (98.3 °F) (Temporal)   Ht 1.646 m (5' 4.8\")   Wt 89 kg (196 lb 3.2 oz)   SpO2 99%  Body mass index is 32.85 kg/m².    General: Well developed, well nourished in no acute distress.  Head: Normocephalic and atraumatic.  Eyes: Conjunctivae and extraocular motions are normal. Pupils are round, and reactive to light, pupil slightly larger on the right but does react/accommodate. No scleral icterus.   Ears:  External ears unremarkable. Tympanic membranes clear and intact.  Nose: Nares patent. No discharge.  Mouth/Throat: Oropharynx is clear and moist. Posterior pharynx without erythema or exudates.  Neck: Supple. Thyroid is not enlarged.  Pulmonary: Clear to ausculation.  Normal effort. No rales, ronchi, or wheezing.  Cardiovascular: Regular rate and rhythm without murmur.  Abdomen: Soft, nontender, nondistended. Normal bowel sounds. No HSM.  Neurologic: Cranial nerves II through XII intact, stable vision loss OD per patient.  Normal gait.   Lymph: No cervical or supraclavicular lymph nodes are palpable  Skin: Warm and dry.  No obvious lesions.  Musculoskeletal: No extremity cyanosis, clubbing, or edema.  Psych:  Appearance: Clothing and grooming normal.  Mood: 'stressed'  Behavior: No psychomotor abnormalities or impulsivity. Attention is good. Patient is pleasant and cooperative.   Eye contact: good   Affect: mood-congruent tearful when discussing  Speech/thought content: Normal speech pattern. Normal insight and reasoning, no evidence of psychotic process.   Denies suicidal or homicidal thoughts.    Assessment & Plan:   26 y.o. female with the following -    1. Acute nonintractable headache, unspecified headache type  This is an acute condition, sounds like side effect from medications as it is improved so continue to observe for now.  Encouraged hydration and when okay to continue ibuprofen as needed.    2. Unilateral blindness  This is a " chronic condition, stable per patient.  Encourage follow-up with optometry.    3. BMI 32.0-32.9,adult  This is a chronic condition.  Patient feels like there is some things in her diet which can be improved so I encouraged her to make some changes as well as exercise.  Will trend.    4. Adjustment disorder with mixed anxiety and depressed mood  This is a chronic condition for patient without safety concerns/SI.  Offered referral for psychology/counseling, declines intervention for now.     5. Need for vaccination  - INFLUENZA VACCINE QUAD INJ (PF)  - Tdap =>8yo IM    I spent a total of 30 minutes with record review, exam, communication with the patient, and documentation of this encounter.    Return if symptoms worsen or fail to improve, for Annual with PAP.    Please note that this dictation was created using voice recognition software. I have made every reasonable attempt to correct obvious errors, but I expect that there are errors of grammar and possibly content that I did not discover before finalizing the note.

## 2021-10-01 NOTE — ASSESSMENT & PLAN NOTE
This is a chronic condition, OD.  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  States that she had a surgery for strabismus at a young age but otherwise does not follow with optometry.  Can see some light/shadows but overall blind.

## 2021-10-01 NOTE — ASSESSMENT & PLAN NOTE
Patient reports this to be a chronic condition.  Depends on the day or situation.  Reports that finances are her current stressor.  Reports she had counseling prior which was not really helpful, no prior medications.  Denies any frequent panic attacks (although reports had an episode about a month ago, self resolved) or SI/HI.

## 2021-10-05 ASSESSMENT — ENCOUNTER SYMPTOMS
HEADACHES: 1
SORE THROAT: 0
SHORTNESS OF BREATH: 0
FEVER: 0
COUGH: 0

## 2021-11-05 ENCOUNTER — OFFICE VISIT (OUTPATIENT)
Dept: MEDICAL GROUP | Facility: PHYSICIAN GROUP | Age: 26
End: 2021-11-05
Payer: COMMERCIAL

## 2021-11-05 ENCOUNTER — HOSPITAL ENCOUNTER (OUTPATIENT)
Facility: MEDICAL CENTER | Age: 26
End: 2021-11-05
Attending: STUDENT IN AN ORGANIZED HEALTH CARE EDUCATION/TRAINING PROGRAM
Payer: COMMERCIAL

## 2021-11-05 VITALS
TEMPERATURE: 98.5 F | OXYGEN SATURATION: 97 % | BODY MASS INDEX: 32.59 KG/M2 | WEIGHT: 195.6 LBS | HEIGHT: 65 IN | SYSTOLIC BLOOD PRESSURE: 108 MMHG | HEART RATE: 68 BPM | DIASTOLIC BLOOD PRESSURE: 80 MMHG

## 2021-11-05 DIAGNOSIS — Z11.3 ENCOUNTER FOR SPECIAL SCREENING EXAMINATION FOR INFECTION WITH PREDOMINANTLY SEXUAL MODE OF TRANSMISSION: ICD-10-CM

## 2021-11-05 DIAGNOSIS — Z00.00 HEALTH CARE MAINTENANCE: ICD-10-CM

## 2021-11-05 DIAGNOSIS — Z01.419 WELL WOMAN EXAM: ICD-10-CM

## 2021-11-05 DIAGNOSIS — N92.6 IRREGULAR MENSTRUATION: ICD-10-CM

## 2021-11-05 DIAGNOSIS — Z12.4 ENCOUNTER FOR PAPANICOLAOU SMEAR FOR CERVICAL CANCER SCREENING: ICD-10-CM

## 2021-11-05 PROCEDURE — 87591 N.GONORRHOEAE DNA AMP PROB: CPT

## 2021-11-05 PROCEDURE — 88175 CYTOPATH C/V AUTO FLUID REDO: CPT

## 2021-11-05 PROCEDURE — 99395 PREV VISIT EST AGE 18-39: CPT | Performed by: STUDENT IN AN ORGANIZED HEALTH CARE EDUCATION/TRAINING PROGRAM

## 2021-11-05 PROCEDURE — 87491 CHLMYD TRACH DNA AMP PROBE: CPT

## 2021-11-05 NOTE — PROGRESS NOTES
Subjective:     CC: PAP smear    HPI:   Mateusz Cole is a 26 y.o. female who presents for annual exam. She is feeling well and denies any complaints that she would like to discuss. GYN ROS as below.    Ob-Gyn/ History:    Patient has GYN provider: no  /Para:   Last Pap Smear: Never.  Gyn Surgery:  D&C for Tab 3/2021  Current Contraceptive Method:  Condoms, happy with method of birth control. Currently sexually active with one male partner.  Last menstrual period:  2021.  Periods irregular, either every 2 weeks (since she was a teen) or will not menstruate for months (not as common). Lasted about 4 prior, now 6. Uses super tampons and goes thru 2 a day. Gets bad cramping the first 1-2 days, takes Pamprin/advil or tylenol.  No significant bloating/fluid retention or pelvic pain,. At times will have dyspareunia, due to inadequate lubrication, states she is not in the mood often. No using lubrication. No abnormal vaginal discharge, just milky without odor.  Folate intake: recommended.    Health Maintenance  Cholesterol Screening: not indicated  Diabetes Screening: will obtained fasting glucose  Diet: not the best  Exercise: only at work  Substance Abuse: denies  Safe in relationship.    Cancer screening  Colorectal Cancer Screening: start at 50  Lung Cancer Screening: n/a  Cervical Cancer Screening: PAP today  Breast Cancer Screening: discuss at 40    Infectious disease screening/Immunizations  --STI Screening: interested in repeat CT/GC and other (had CT in 2018 and treated)  --Practices safe sex.  --HIV Screening: interested, ordered  --Hepatitis C Screening: interested, ordered  --Immunizations:    Influenza: 10/1/2021   HPV:  Series complete   Tetanus: 10/1/2021   Pneumococcal : not indicated   Other immunizations: COVID received 2nd dose 2021    She  has a past medical history of Blind and Chlamydia infection (2018).  She  has a past surgical history that includes eye surgery  (Right, 2000) and dilation and curettage (03/2021).    Family History   Problem Relation Age of Onset   • Other Mother         mental health (anxiety/depression)   • Lung Cancer Father 53        smoker   • Other Sister         mental health (ADHD, anxiety/depression, borderline PD)   • Cancer Paternal Grandfather         throat (from smoking)   • Colon Cancer Neg Hx    • Breast Cancer Neg Hx    • Diabetes Neg Hx    • Heart Disease Neg Hx      Social History     Socioeconomic History   • Marital status: Single     Spouse name: Not on file   • Number of children: 0   • Years of education: Not on file   • Highest education level: Not on file   Occupational History   • Not on file   Tobacco Use   • Smoking status: Never Smoker   • Smokeless tobacco: Never Used   Vaping Use   • Vaping Use: Never used   Substance and Sexual Activity   • Alcohol use: No   • Drug use: No   • Sexual activity: Yes     Partners: Male     Birth control/protection: Condom     Comment: 1 partner   Other Topics Concern   • Not on file   Social History Narrative    From [place].  Lives at home with her boyfriend.  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     Patient Active Problem List    Diagnosis Date Noted   • Irregular menstruation 11/05/2021   • BMI 32.0-32.9,adult 10/01/2021   • Unilateral blindness 10/01/2021   • Adjustment disorder with mixed anxiety and depressed mood 10/01/2021     Current Outpatient Medications   Medication Sig Dispense Refill   • ibuprofen (ADVIL) 200 MG Tab Take 400 mg by mouth every 8 hours as needed.       No current facility-administered medications for this visit.     No Known Allergies    Review of Systems  Constitutional: Negative for fever, chills. Does report fatigue, but does work 6 days a week.  HENT: Negative for congestion.  Respiratory: Negative for cough and shortness of breath.  Cardiovascular: Negative for leg swelling.   Gastrointestinal: Negative for nausea,  "vomiting, abdominal pain and diarrhea/constipation.   Genitourinary: Negative for dysuria and hematuria.   Skin: Negative for rash.   Neurological: Negative for dizziness, focal weakness and headaches.   Endo/Heme/Allergies: Does not bleed easily.   Psychiatric/Behavioral:  The patient is depressed/anxious (financial stressors), no SI/HI.    Objective:     /80   Pulse 68   Temp 36.9 °C (98.5 °F) (Temporal)   Ht 1.646 m (5' 4.8\")   Wt 88.7 kg (195 lb 9.6 oz)   LMP 10/13/2021   SpO2 97%   BMI 32.75 kg/m²   Body mass index is 32.75 kg/m².  Wt Readings from Last 4 Encounters:   11/05/21 88.7 kg (195 lb 9.6 oz)   10/01/21 89 kg (196 lb 3.2 oz)   09/23/21 88.6 kg (195 lb 6.4 oz)   09/28/19 85.3 kg (188 lb)     Physical Exam:  Constitutional: Well-developed and well-nourished. Not diaphoretic. No distress.   Skin: Skin is warm and dry. No rash noted.  Head: Atraumatic without lesions.  Eyes: Conjunctivae and extraocular motions are normal. Pupils are equal, round. No scleral icterus.   Mouth/Throat: Wearing mask.  Neck: Supple, trachea midline. Normal range of motion. No thyromegaly present. No lymphadenopathy--cervical or supraclavicular.  Cardiovascular: Regular rate and rhythm, S1 and S2 without murmur, rubs, or gallops.  Lungs: Normal inspiratory effort, CTA bilaterally, no wheezes/rhonchi/rales  Abdomen: Soft, non tender, and without distention. Active bowel sounds in all four quadrants. No rebound, guarding, masses or HSM.  : Perineum and external genitalia normal without rash. Vagina with small amount of white, milky discharge. Cervix without visible lesions or discharge. Bimanual exam without adnexal masses or cervical motion tenderness.  Extremities: No cyanosis, clubbing, erythema, nor edema.  Neurological: Alert and oriented x 3. Normal gait.  Psychiatric:  Behavior, mood, and affect are appropriate.    A chaperone was offered to the patient during today's exam. Chaperone name: Lena Nayak MA " was present.    Assessment and Plan:     1. Well woman exam  HCM:  As above.  Labs per orders  Immunizations UTD.  Age appropriate anticipatory guidance discussed.     2. Encounter for Papanicolaou smear for cervical cancer screening  First PAP, if normal repeat in 3yrs.  - THINPREP PAP, REFLEX HPV ON ASC-US AND ABOVE; Future    3. Irregular menstruation  Chronic. Will obtain labs as below. Had adverse SE to contraception prior (mood symptoms), so deferring for now. If still unclear, may consider pelvic US.  - CBC WITH DIFFERENTIAL; Future  - TSH WITH REFLEX TO FT4; Future  - TESTOSTERONE F&T FEMALES/CHILD; Future  - PROLACTIN; Future    4. Encounter for special screening examination for infection with predominantly sexual mode of transmission  Asymptomatic aside from fatigue (multifactorial? Given 6 day work schedule and stress/mood issues from stressors. Hx of CT in 2018, using condoms and has 1 partner. Agrees to screening as below.  - Chlamydia/GC PCR Urine Or Swab; Future  - HIV AG/AB COMBO ASSAY SCREENING; Future  - HEP C VIRUS ANTIBODY; Future  - HEP B SURFACE ANTIGEN; Future  - T.PALLIDUM AB EIA; Future    5. BMI 32.0-32.9,adult  Chronic, has some barrier to improved diet and exercise-aware of benefit of lifestyle changes. Will trend.    6. Health care maintenance  Will obtain fasting glucose given inactivity and BMI 32.  - Comp Metabolic Panel; Future    Follow-up: Return in about 6 months (around 5/5/2022), or if symptoms worsen or fail to improve, for anxiety/depression check in.

## 2021-11-06 DIAGNOSIS — Z12.4 ENCOUNTER FOR PAPANICOLAOU SMEAR FOR CERVICAL CANCER SCREENING: ICD-10-CM

## 2021-11-06 PROCEDURE — 88175 CYTOPATH C/V AUTO FLUID REDO: CPT

## 2021-11-08 LAB — CYTOLOGY REG CYTOL: NORMAL

## 2021-11-09 LAB
C TRACH DNA SPEC QL NAA+PROBE: NEGATIVE
N GONORRHOEA DNA SPEC QL NAA+PROBE: NEGATIVE
SPECIMEN SOURCE: NORMAL

## 2022-03-22 ENCOUNTER — APPOINTMENT (OUTPATIENT)
Dept: URGENT CARE | Facility: PHYSICIAN GROUP | Age: 27
End: 2022-03-22
Payer: COMMERCIAL

## 2024-02-16 ENCOUNTER — OCCUPATIONAL MEDICINE (OUTPATIENT)
Dept: URGENT CARE | Facility: PHYSICIAN GROUP | Age: 29
End: 2024-02-16
Payer: COMMERCIAL

## 2024-02-16 ENCOUNTER — HOSPITAL ENCOUNTER (OUTPATIENT)
Dept: RADIOLOGY | Facility: MEDICAL CENTER | Age: 29
End: 2024-02-16
Attending: FAMILY MEDICINE
Payer: COMMERCIAL

## 2024-02-16 VITALS
DIASTOLIC BLOOD PRESSURE: 70 MMHG | BODY MASS INDEX: 33.99 KG/M2 | RESPIRATION RATE: 18 BRPM | SYSTOLIC BLOOD PRESSURE: 120 MMHG | HEIGHT: 65 IN | OXYGEN SATURATION: 98 % | WEIGHT: 204 LBS | TEMPERATURE: 98 F | HEART RATE: 80 BPM

## 2024-02-16 DIAGNOSIS — S09.8XXA BLUNT HEAD TRAUMA, INITIAL ENCOUNTER: ICD-10-CM

## 2024-02-16 PROCEDURE — 70450 CT HEAD/BRAIN W/O DYE: CPT

## 2024-02-16 PROCEDURE — 3078F DIAST BP <80 MM HG: CPT | Performed by: FAMILY MEDICINE

## 2024-02-16 PROCEDURE — 99213 OFFICE O/P EST LOW 20 MIN: CPT | Performed by: FAMILY MEDICINE

## 2024-02-16 PROCEDURE — 3074F SYST BP LT 130 MM HG: CPT | Performed by: FAMILY MEDICINE

## 2024-02-16 NOTE — LETTER
"    EMPLOYEE’S CLAIM FOR COMPENSATION/ REPORT OF INITIAL TREATMENT  FORM C-4  PLEASE TYPE OR PRINT    EMPLOYEE’S CLAIM - PROVIDE ALL INFORMATION REQUESTED   First Name                    ANTONIO Child Last Name  Kelsey Birthdate                    1995                Sex  []M  [x]F Claim Number (Insurer’s Use Only)     Home Address  [address] Age  28 y.o. 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 (if applicable)  Pulling Rolling Cart    How did this injury or occupational disease occur? (Be specific and answer in detail. Use additional sheet if necessary)  Pulling Rolling Cart, didnt see that cart behind me, tripped & fell backwards onto back + hit head   If you believe that you have an occupational disease, when did you first have knowledge of the disability and its relationship to your employment?  N/A Witnesses to the Accident (if applicable)  Rico Maurice      Nature of Injury or Occupational Disease    Part(s) of Body Injured or Affected  Skull N/A N/A    I CERTIFY THAT THE ABOVE IS TRUE AND CORRECT TO T HE BEST OF MY " KNOWLEDGE AND THAT I HAVE PROVIDED THIS INFORMATION IN ORDER TO OBTAIN THE BENEFITS OF [place], OR [place]).  I HEREBY AUTHORIZE ANY PHYSICIAN, CHIROPRACTOR, SURGEON, PRACTITIONER OR ANY OTHER PERSON, ANY HOSPITAL, INCLUDING [facility] OR [facility], ANY  MEDICAL SERVICE ORGANIZATION, ANY INSURANCE COMPANY, OR OTHER INSTITUTION OR ORGANIZATION TO RELEASE TO EACH OTHER, ANY MEDICAL OR OTHER INFORMATION, INCLUDING BENEFITS PAID OR PAYABLE, PERTINENT TO THIS INJURY OR DISEASE, EXCEPT INFORMATION RELATIVE TO DIAGNOSIS, TREATMENT AND/OR COUNSELING FOR AIDS, PSYCHOLOGICAL CONDITIONS, ALCOHOL OR CONTROLLED SUBSTANCES, FOR WHICH I MUST GIVE SPECIFIC AUTHORIZATION.  A PHOTOSTAT OF THIS AUTHORIZATION SHALL BE VALID AS THE ORIGINAL.     Date 2/16/2024   [place] Employee’s Original or  *Electronic Signature   THIS REPORT MUST BE COMPLETED AND MAILED WITHIN 3 WORKING DAYS OF TREATMENT   [facility]    Name of Facility  [facility]   Date 2/16/2024 Diagnosis and Description of Injury or Occupational Disease  (S09.8XXA) Blunt head trauma, initial encounter  The encounter diagnosis was Blunt head trauma, initial encounter. Is there evidence that the injured employee was under the influence of alcohol and/or another controlled substance at the time of accident?  []No  [] Yes (if yes, please explain)   Hour 10:16 AM  No   Treatment: Blunt head trauma, initial encounter  CT unremarkable.     Otc tylenol, prn    Cleared for full duty.      Follow up in one week        Have you advised the patient to remain off work five days or more?   [] Yes Indicate dates: From   To    []No If no, is the injured employee capable of: [] full duty [] modified duty                                                             Yes     If modified duty, specify any limitations / restrictions:                                                                                                                                                                                                                                                                                                                                                                                                                   X-Ray Findings:      From information given by the employee, together with medical evidence, can you directly connect this injury or occupational disease as job incurred?  []Yes   [] No Yes    Is additional medical care by a physician indicated? []Yes [] No  Yes    Do you know of any previous injury or disease contributing to this condition or occupational disease? []Yes [] No (Explain if yes)                          No   Date  2/16/2024 Print Health Care Provider’s Name  Myles Campa M.D. I certify that the employer’s copy of  this form was delivered to the employer on:   Address  [address] [place] [address]. [address] INSURER'S USE ONLY                       [facility] Zip  [address] Provider’s Tax ID Number  [id number]   Telephone  Dept: [phone]    Health Care Provider’s Original or Electronic Signature  e-MYLES Diaz M.D. Degree (MD,DO, DC,PA-C,APRN)  MD  Choose (if applicable)      ORIGINAL - TREATING HEALTHCARE PROVIDER PAGE 2 - INSURER/TPA PAGE 3 - EMPLOYER PAGE 4 - EMPLOYEE             Form C-4 (rev.08/23)        BRIEF DESCRIPTION OF RIGHTS AND BENEFITS  (Pursuant to [place] 616[place])    Notice of Injury or Occupational Disease (Incident Report Form C-1): If an injury or occupational disease (OD) arises out of and in the course of employment, you must provide written notice to your employer as soon as practicable, but no later than 7 days after the accident or OD. Your employer shall maintain a sufficient supply of the required forms.    Claim for Compensation (Form C-4): If medical treatment is sought, the form  "C-4 is available at the place of initial treatment. A completed \"Claim for Compensation\" (Form C-4) must be filed within 90 days after an accident or OD. The treating physician or chiropractor must, within 3 working days after treatment, complete and mail to the employer, the employer's insurer and third-party , the Claim for Compensation.    Medical Treatment: If you require medical treatment for your on-the-job injury or OD, you may be required to select a physician or chiropractor from a list provided by your workers’ compensation insurer, if it has contracted with an Organization for Managed Care (MCO) or Preferred Provider Organization (PPO) or providers of health care. If your employer has not entered into a contract with an MCO or PPO, you may select a physician or chiropractor from the Panel of Physicians and Chiropractors. Any medical costs related to your industrial injury or OD will be paid by your insurer.    Temporary Total Disability (TTD): If your doctor has certified that you are unable to work for a period of at least 5 consecutive days, or 5 cumulative days in a 20-day period, or places restrictions on you that your employer does not accommodate, you may be entitled to TTD compensation.    Temporary Partial Disability (TPD): If the wage you receive upon reemployment is less than the compensation for TTD to which you are entitled, the insurer may be required to pay you TPD compensation to make up the difference. TPD can only be paid for a maximum of 24 months.    Permanent Partial Disability (PPD): When your medical condition is stable and there is an indication of a PPD as a result of your injury or OD, within 30 days, your insurer must arrange for an evaluation by a rating physician or chiropractor to determine the degree of your PPD. The amount of your PPD award depends on the date of injury, the results of the PPD evaluation, your age and wage.    Permanent Total Disability (PTD): " If you are medically certified by a treating physician or chiropractor as permanently and totally disabled and have been granted a PTD status by your insurer, you are entitled to receive monthly benefits not to exceed 66 2/3% of your average monthly wage. The amount of your PTD payments is subject to reduction if you previously received a lump-sum PPD award.    Vocational Rehabilitation Services: You may be eligible for vocational rehabilitation services if you are unable to return to the job due to a permanent physical impairment or permanent restrictions as a result of your injury or occupational disease.    Transportation and Per Charlee Reimbursement: You may be eligible for travel expenses and per charlee associated with medical treatment.    Reopening: You may be able to reopen your claim if your condition worsens after claim closure.     Appeal Process: If you disagree with a written determination issued by the insurer or the insurer does not respond to your request, you may appeal to the Department of Administration, , by following the instructions contained in your determination letter. You must appeal the determination within 70 days from the date of the determination letter at [address], [address], [place] [address], or [address] [facility] [address], [place] [address]. If you disagree with the  decision, you may appeal to the Department of Administration, . You must file your appeal within 30 days from the date of the  decision letter at [address], [address], [place] [address], or [address] [facility] [address], [place] [address]. If you disagree with a decision of an , you may file a petition for judicial review with the District Court. You must do so within 30 days of the Appeal Officer’s decision. You may be represented by an  at your own expense or you may contact the  [facility] for possible representation.    [place]  for Injured Workers (NAIW): If you disagree with a  decision, you may request that NAIW represent you without charge at an  Hearing. For information regarding denial of benefits, you may contact the [facility] at: [address], [address], [place] [address], [phone], or [address], [place] [address], [phone]    To File a Complaint with the Division: If you wish to file a complaint with the  of the Division of Industrial Relations (DIR),  please contact the Workers’ Compensation Section, [address], [address], [place], [place] [address], telephone [phone], or [address] [facility] [address] [place], [address], [place], [place] [address], telephone [phone].    For assistance with Workers’ Compensation Issues: You may contact the [facility] Office for Consumer Health Assistance, [address] [facility] [address] [place], [address], [place], [place] [address], Toll Free 1-106.802.1719, Web site: http://[facility].[place].gov/Programs/MARTIN E-mail: [email or web address]              __________________________________________________________________                                    _________________            Employee Name / Signature                                                                                                                            Date                                                                                                                                                                                                                              D-2 (rev. 10/20)

## 2024-02-16 NOTE — PROGRESS NOTES
Subjective:      Chief Complaint   Patient presents with    Injury      NEW: DOI- 02/16/2004. When pulling a cart tripped backwards and hit back of head. No loss of consciousness, was dizzy at first.          DOI: 2/16              Tripped and fell backward and hit  back of head on ground.      There was no loss of consciousness. There was no blood loss.      She only c/o mild headache.         Pertinent negatives include no n/v, blurred vision, disorientation, memory loss, numbness, tinnitus or weakness.        Past Medical History:   Diagnosis Date    Chlamydia infection 2018    treated    Blind     Blind in right eye         Social History     Tobacco Use    Smoking status: Never    Smokeless tobacco: Never   Vaping Use    Vaping Use: Never used   Substance Use Topics    Alcohol use: No    Drug use: No         Current Outpatient Medications on File Prior to Visit   Medication Sig Dispense Refill    ibuprofen (ADVIL) 200 MG Tab Take 400 mg by mouth every 8 hours as needed. (Patient not taking: Reported on 2/16/2024)       No current facility-administered medications on file prior to visit.         Family History   Problem Relation Age of Onset    Other Mother         mental health (anxiety/depression)    Lung Cancer Father 53        smoker    Other Sister         mental health (ADHD, anxiety/depression, borderline PD)    Cancer Paternal Grandfather         throat (from smoking)    Colon Cancer Neg Hx     Breast Cancer Neg Hx     Diabetes Neg Hx     Heart Disease Neg Hx          Review of Systems   Constitutional: Negative for fever.   HENT: Negative for tinnitus.    Eyes: Negative for blurred vision and double vision.   Respiratory: Negative for shortness of breath.    Cardiovascular: Negative for chest pain.   Gastrointestinal: Negative for abdominal pain, n/v.   Skin: Negative for itching and rash.   Neurological: Positive for headaches. Negative for dizziness, tingling, tremors, sensory change, weakness and  "numbness.   Psychiatric/Behavioral: Negative for memory loss.   All other systems reviewed and are negative.         Objective:     /70   Pulse 80   Temp 36.7 °C (98 °F)   Resp 18   Ht 1.646 m (5' 4.8\")   Wt 92.5 kg (204 lb)   SpO2 98%       Physical Exam   Constitutional: pt is oriented to person, place, and time. Pt appears well-developed and well-nourished. No distress.   HENT:   Head: Normocephalic .    No bony step-off or scalp hematoma  No periorbital ecchymosis, Bonilla's sign  , hemotympanum , cerebrospinal fluid  otorrhea, or CSF rhinorrhea  Right Ear: External ear normal.   Left Ear: External ear normal.   Mouth/Throat: Oropharynx is clear and moist.   Eyes: Conjunctivae and EOM are normal. Pupils are equal, round, and reactive to light. No scleral icterus.   Neck: Normal range of motion. Neck supple.   Cardiovascular: Normal rate, regular rhythm, normal heart sounds and intact distal pulses.  Exam reveals no gallop and no friction rub.    No murmur heard.  Pulmonary/Chest: Effort normal and breath sounds normal. No respiratory distress. Pt has no wheezes or rales.  .   Musculoskeletal: Normal range of motion. no edema or tenderness.   Neurologic: Alert and oriented. Cranial nerves II-XII intact, EOMs intact, no tongue deviation, PERRL, no facial asymmetry to motor or sensation, symmetric palate,   no pronator drift. No focal motor deficits. Symmetric reflexes. Normal station and gait, normal tandem walk. Coordination normal.   Skin: Skin is warm and dry. Pt is not diaphoretic. No erythema.   Psychiatric: pt has a normal mood and affect. The patient's behavior is normal. Judgment normal.   Nursing note and vitals reviewed.              Assessment/Plan:      1. Blunt head trauma, initial encounter  CT unremarkable.     Otc tylenol, prn    Cleared for full duty.      Follow up in one week        "

## 2024-02-16 NOTE — LETTER
[address] - [place] [address]  Phone:  [phone] - Fax:  [phone]   [facility] Progress Report and Disability Certification  Date of Service: 2/16/2024   No Show:  No  Date / Time of Next Visit:     Claim Information   Patient Name: Mateusz Cole  Claim Number:     Employer:   [employer] Date of Injury: 2/16/2024     Insurer / TPA: [employer]  ID / SSN:     Occupation: [employer]  Diagnosis: The encounter diagnosis was Blunt head trauma, initial encounter.    Medical Information   Related to Industrial Injury? No    Subjective Complaints:        DOI: 2/16              Tripped and fell backward and hit  back of head on ground.      There was no loss of consciousness. There was no blood loss.      She only c/o mild headache.         Pertinent negatives include no n/v, blurred vision, disorientation, memory loss, numbness, tinnitus or weakness.     Objective Findings: Head: Normocephalic .    No bony step-off or scalp hematoma  No periorbital ecchymosis, Bonilla's sign  , hemotympanum , cerebrospinal fluid  otorrhea, or CSF rhinorrhea  Neurologic: Alert and oriented. Cranial nerves II-XII intact, EOMs intact, no tongue deviation, PERRL, no facial asymmetry to motor or sensation, symmetric palate,   no pronator drift. No focal motor deficits. Symmetric reflexes. Normal station and gait, normal tandem walk. Coordination normal.    Pre-Existing Condition(s):     Assessment:   Initial Visit    Status: Additional Care Required  Permanent Disability:No    Plan:      Diagnostics: CT  Comments:negative    Comments:       Disability Information   Status: Released to Full Duty    From:     Through:   Restrictions are:     Physical Restrictions   Sitting:    Standing:    Stooping:    Bending:      Squatting:    Walking:    Climbing:    Pushing:      Pulling:    Other:    Reaching Above Shoulder (L):   Reaching Above Shoulder  (R):       Reaching Below Shoulder (L):    Reaching Below Shoulder (R):      Not to exceed Weight Limits   Carrying(hrs):   Weight Limit(lb):   Lifting(hrs):   Weight  Limit(lb):     Comments: Blunt head trauma, initial encounter  CT unremarkable.     Otc tylenol, prn    Cleared for full duty.      Follow up in one week        Repetitive Actions   Hands: i.e. Fine Manipulations from Grasping:     Feet: i.e. Operating Foot Controls:     Driving / Operate Machinery:     Health Care Provider’s Original or Electronic Signature  Myles Campa M.D. Health Care Provider’s Original or Electronic Signature    Ferny Sheppard DO MPH     Clinic Name / Location: [address]  [place] [address] Clinic Phone Number: Dept: [phone]   Appointment Time: 9:45 Am Visit Start Time: 10:16 AM   Check-In Time:  10:06 Am Visit Discharge Time: 12:26 PM   Original-Treating Physician or Chiropractor    Page 2-Insurer/TPA    Page 3-Employer    Page 4-Employee

## 2024-02-23 ENCOUNTER — OCCUPATIONAL MEDICINE (OUTPATIENT)
Dept: URGENT CARE | Facility: PHYSICIAN GROUP | Age: 29
End: 2024-02-23
Payer: COMMERCIAL

## 2024-02-23 VITALS
TEMPERATURE: 98.3 F | WEIGHT: 205 LBS | RESPIRATION RATE: 18 BRPM | SYSTOLIC BLOOD PRESSURE: 122 MMHG | DIASTOLIC BLOOD PRESSURE: 76 MMHG | BODY MASS INDEX: 35 KG/M2 | OXYGEN SATURATION: 96 % | HEIGHT: 64 IN | HEART RATE: 69 BPM

## 2024-02-23 DIAGNOSIS — S09.8XXA BLUNT HEAD TRAUMA, INITIAL ENCOUNTER: ICD-10-CM

## 2024-02-23 DIAGNOSIS — Z02.6 ENCOUNTER RELATED TO WORKER'S COMPENSATION CLAIM: ICD-10-CM

## 2024-02-23 PROCEDURE — 99213 OFFICE O/P EST LOW 20 MIN: CPT | Performed by: PHYSICIAN ASSISTANT

## 2024-02-23 PROCEDURE — 3074F SYST BP LT 130 MM HG: CPT | Performed by: PHYSICIAN ASSISTANT

## 2024-02-23 PROCEDURE — 3078F DIAST BP <80 MM HG: CPT | Performed by: PHYSICIAN ASSISTANT

## 2024-02-23 NOTE — PROGRESS NOTES
"Krsi Cole is a 28 y.o. female who presents with Work-Related Injury (DOI 2/16/24, Pt states no issue or   concerns )      Copied from last visit:  \"DOI: 2/16      Tripped and fell backward and hit  back of head on ground.    There was no loss of consciousness. There was no blood loss.   She only c/o mild headache.  \"    Update 2/23/2024: Patient did undergo head CT at last visit which was unremarkable.  She is tolerating full duty.  She has occasional dizziness but has had no issues with a trial of full duty over the past week.  Headaches have resolved.  Denies unilateral weakness speech or vision changes.  No significant difficulties with concentration.                Objective     /76 (BP Location: Left arm, Patient Position: Sitting, BP Cuff Size: Adult)   Pulse 69   Temp 36.8 °C (98.3 °F) (Temporal)   Resp 18   Ht 1.626 m (5' 4\")   Wt 93 kg (205 lb)   SpO2 96%   BMI 35.19 kg/m²      Physical Exam    Neuro exam nonfocal.  Cranial nerves II through XII grossly intact.  No pronator drift.  No facial asymmetry.  Cognitive functions intact.  Speech fluent.                   Assessment & Plan        1. Blunt head trauma, initial encounter      2. Encounter related to worker's compensation claim    Patient is released, MMI  No residual focal deficits  Tolerated trial of full duty x 1 week without difficulties  Tylenol/ibuprofen as needed for headaches                "

## 2024-02-23 NOTE — LETTER
"   [address] - [place] [address]  Phone:  [phone] - Fax:  [phone]   [facility] Progress Report and Disability Certification  Date of Service: 2/23/2024   No Show:  No  Date / Time of Next Visit:     Claim Information   Patient Name: Mateusz Cole  Claim Number:     Employer:   [employer]  Date of Injury: 2/16/2024     Insurer / TPA: [employer]  ID / SSN:     Occupation: Warehouse  Diagnosis: Diagnoses of Blunt head trauma, initial encounter and Encounter related to worker's compensation claim were pertinent to this visit.    Medical Information   Related to Industrial Injury? Yes   Subjective Complaints:  Copied from last visit:  \"DOI: 2/16      Tripped and fell backward and hit  back of head on ground.    There was no loss of consciousness. There was no blood loss.   She only c/o mild headache.  \"    Update 2/23/2024: Patient did undergo head CT at last visit which was unremarkable.  She is tolerating full duty.  She has occasional dizziness but has had no issues with a trial of full duty over the past week.  Headaches have resolved.  Denies unilateral weakness speech or vision changes.  No significant difficulties with concentration.   Objective Findings: Neuro exam nonfocal.  Cranial nerves II through XII grossly intact.  No pronator drift.  No facial asymmetry.  Cognitive functions intact.  Speech fluent.   Pre-Existing Condition(s):     Assessment:   Condition Improved    Status: Discharged /  MMI  Permanent Disability:No    Plan: Medication  Comments:Tylenol/ibuprofen as needed for headache    Diagnostics:      Comments:       Disability Information   Status: Released to Full Duty    From:     Through:   Restrictions are:     Physical Restrictions   Sitting:    Standing:    Stooping:    Bending:      Squatting:    Walking:    Climbing:    Pushing:      Pulling:    Other:    Reaching Above Shoulder (L):  "  Reaching Above Shoulder (R):       Reaching Below Shoulder (L):    Reaching Below Shoulder (R):      Not to exceed Weight Limits   Carrying(hrs):   Weight Limit(lb):   Lifting(hrs):   Weight  Limit(lb):     Comments: Patient is released, MMI  No residual focal deficits  Tolerated trial of full duty x 1 week without difficulties  Tylenol/ibuprofen as needed for headaches    Repetitive Actions   Hands: i.e. Fine Manipulations from Grasping:     Feet: i.e. Operating Foot Controls:     Driving / Operate Machinery:     Health Care Provider’s Original or Electronic Signature  Mary Jacques P.A.-C. Health Care Provider’s Original or Electronic Signature    Ferny Sheppard DO MPH     Clinic Name / Location: [address]  [place] [address] Clinic Phone Number: Dept: [phone]   Appointment Time: 8:00 Am Visit Start Time: 8:04 AM   Check-In Time:  7:40 Am Visit Discharge Time:  8:34 AM    Original-Treating Physician or Chiropractor    Page 2-Insurer/TPA    Page 3-Employer    Page 4-Employee